# Patient Record
Sex: MALE | Race: WHITE | URBAN - METROPOLITAN AREA
[De-identification: names, ages, dates, MRNs, and addresses within clinical notes are randomized per-mention and may not be internally consistent; named-entity substitution may affect disease eponyms.]

---

## 2019-07-30 ENCOUNTER — PROCEDURE VISIT (OUTPATIENT)
Dept: AUDIOLOGY | Age: 64
End: 2019-07-30
Payer: COMMERCIAL

## 2019-07-30 DIAGNOSIS — H93.13 TINNITUS OF BOTH EARS: ICD-10-CM

## 2019-07-30 DIAGNOSIS — H90.3 SENSORINEURAL HEARING LOSS, BILATERAL: Primary | ICD-10-CM

## 2019-07-30 PROCEDURE — 92567 TYMPANOMETRY: CPT | Performed by: AUDIOLOGIST

## 2019-07-30 PROCEDURE — 92557 COMPREHENSIVE HEARING TEST: CPT | Performed by: AUDIOLOGIST

## 2019-11-08 ENCOUNTER — TELEPHONE (OUTPATIENT)
Dept: AUDIOLOGY | Age: 64
End: 2019-11-08

## 2019-12-06 ENCOUNTER — TELEPHONE (OUTPATIENT)
Dept: AUDIOLOGY | Age: 64
End: 2019-12-06

## 2023-08-23 PROBLEM — K21.9 GERD (GASTROESOPHAGEAL REFLUX DISEASE): Status: ACTIVE | Noted: 2023-08-23

## 2023-08-23 PROBLEM — R07.9 CHEST PAIN: Status: ACTIVE | Noted: 2023-08-23

## 2023-08-23 PROBLEM — R94.39 ABNORMAL STRESS TEST: Status: ACTIVE | Noted: 2023-08-23

## 2023-08-23 PROBLEM — Z95.5 HISTORY OF CORONARY ARTERY STENT PLACEMENT: Status: ACTIVE | Noted: 2023-08-23

## 2023-08-23 PROBLEM — I25.10 CAD (CORONARY ARTERY DISEASE): Status: ACTIVE | Noted: 2023-08-23

## 2023-08-23 RX ORDER — ACETAMINOPHEN 325 MG/1
2 TABLET ORAL CONTINUOUS PRN
COMMUNITY

## 2023-08-23 RX ORDER — CLOPIDOGREL BISULFATE 75 MG/1
1 TABLET ORAL DAILY
COMMUNITY

## 2023-08-23 RX ORDER — TRIAMCINOLONE ACETONIDE 1 MG/G
CREAM TOPICAL 2 TIMES DAILY
COMMUNITY
Start: 2011-09-07 | End: 2024-04-04 | Stop reason: WASHOUT

## 2023-08-23 RX ORDER — CALCIPOTRIENE 50 UG/G
OINTMENT TOPICAL 2 TIMES DAILY
COMMUNITY
Start: 2011-09-07 | End: 2024-04-04 | Stop reason: WASHOUT

## 2023-08-23 RX ORDER — LOSARTAN POTASSIUM AND HYDROCHLOROTHIAZIDE 25; 100 MG/1; MG/1
1 TABLET ORAL DAILY
COMMUNITY
End: 2023-10-03 | Stop reason: SDUPTHER

## 2023-08-23 RX ORDER — GLIPIZIDE 10 MG/1
10 TABLET ORAL 2 TIMES DAILY
COMMUNITY

## 2023-08-23 RX ORDER — METFORMIN HYDROCHLORIDE 1000 MG/1
1 TABLET ORAL
COMMUNITY
Start: 2009-08-20

## 2023-08-23 RX ORDER — ATORVASTATIN CALCIUM 80 MG/1
80 TABLET, FILM COATED ORAL NIGHTLY
COMMUNITY
End: 2024-04-04 | Stop reason: SDUPTHER

## 2023-08-23 RX ORDER — PANTOPRAZOLE SODIUM 20 MG/1
20 TABLET, DELAYED RELEASE ORAL
COMMUNITY
End: 2023-10-03 | Stop reason: SDUPTHER

## 2023-08-23 RX ORDER — BENZONATATE 100 MG/1
1 CAPSULE ORAL EVERY 6 HOURS PRN
COMMUNITY
Start: 2011-10-20 | End: 2024-04-04 | Stop reason: WASHOUT

## 2023-08-23 RX ORDER — NAPROXEN SODIUM 220 MG/1
1 TABLET, FILM COATED ORAL DAILY
COMMUNITY
Start: 2011-09-07 | End: 2024-04-04 | Stop reason: WASHOUT

## 2023-08-23 RX ORDER — EZETIMIBE 10 MG/1
10 TABLET ORAL DAILY
COMMUNITY
End: 2023-10-17 | Stop reason: SDUPTHER

## 2023-08-23 RX ORDER — METOPROLOL TARTRATE 25 MG/1
TABLET, FILM COATED ORAL
COMMUNITY
End: 2023-10-03 | Stop reason: SDUPTHER

## 2023-08-23 RX ORDER — SIMVASTATIN 80 MG/1
1 TABLET, FILM COATED ORAL NIGHTLY
COMMUNITY
Start: 2009-12-17 | End: 2023-10-03 | Stop reason: SDUPTHER

## 2023-08-23 RX ORDER — ISOSORBIDE MONONITRATE 30 MG/1
30 TABLET, EXTENDED RELEASE ORAL DAILY
COMMUNITY
End: 2023-10-03 | Stop reason: SDUPTHER

## 2023-10-01 ASSESSMENT — ENCOUNTER SYMPTOMS
CONSTITUTIONAL NEGATIVE: 1
DYSPNEA ON EXERTION: 0
DECREASED APPETITE: 0
FALLS: 0
BLURRED VISION: 0
SHORTNESS OF BREATH: 0
COUGH: 0
RESPIRATORY NEGATIVE: 1
GASTROINTESTINAL NEGATIVE: 1
IRREGULAR HEARTBEAT: 0
DEPRESSION: 0
MEMORY LOSS: 0
LIGHT-HEADEDNESS: 0
SYNCOPE: 0
ORTHOPNEA: 0
PALPITATIONS: 0
FOCAL WEAKNESS: 0

## 2023-10-01 NOTE — PROGRESS NOTES
Chief Complaint:   3 month cardiovascular follow up of CAD/HTN     History Of Present Illness:    Mr. Unger is coming today as a 3-month cardiovascular follow-up.  We have followed this patient previously for coronary artery disease, hypertension, and hyperlipidemia.  He PCI with MARCEL of LAD in May,2023.    Patient comes in today feeling well.  He just got back from vacation.  He denies any chest pain, pressure, palpitations, orthopnea, or edema.  He reports gaining weight due to dietary indiscretion.  He has had no recent hospitalizations or emergency room visits.    Echocardiogram from May, 2023 shows normal LV systolic function, EF 60%, normal RV size and function, no significant valvular abnormalities.    Left heart catheterization May 15, 2023 with IVUS guided PCI of the proximal LAD MARCEL x1.    Last Recorded Vitals:  Vitals:    10/03/23 0929   BP: 108/60   Pulse: 68   Weight: 93.1 kg (205 lb 3.2 oz)       Past Medical History:  Active Ambulatory Problems     Diagnosis Date Noted    Benign essential hypertension 08/20/2009    Hyperlipidemia 01/01/1995    Type 2 diabetes mellitus (CMS/[facility]) 01/01/2005    Abnormal stress test 08/23/2023    CAD (coronary artery disease) 08/23/2023    Chest pain 08/23/2023    GERD (gastroesophageal reflux disease) 08/23/2023    History of coronary artery stent placement 08/23/2023     Resolved Ambulatory Problems     Diagnosis Date Noted    No Resolved Ambulatory Problems     No Additional Past Medical History        Past Surgical History:  No past surgical history on file.       Social History:      Family History:  No family history on file.     Allergies:  Lisinopril    Outpatient Medications:  Current Outpatient Medications   Medication Instructions    acetaminophen (Tylenol) 325 mg tablet 2 tablets, oral, Continuous PRN    aspirin 81 mg chewable tablet 1 tablet, oral, Daily    aspirin 81 mg EC tablet TAKE 1 TABLET BY MOUTH ONCE DAILY    atorvastatin (Lipitor) 80 mg tablet TAKE 1  TABLET BY MOUTH ONCE DAILY    atorvastatin (LIPITOR) 80 mg, oral, Nightly    benzonatate (Tessalon Perles) 100 mg capsule 1 capsule, oral, Every 6 hours PRN    calcipotriene (Dovonex) 0.005 % ointment Topical, 2 times daily    clopidogrel (Plavix) 75 mg tablet 1 tablet, oral, Daily    ezetimibe (ZETIA) 10 mg, oral, Daily    glipiZIDE (GLUCOTROL) 10 mg, oral, 2 times daily    isosorbide mononitrate ER (Imdur) 30 mg 24 hr tablet TAKE 1 TABLET BY MOUTH ONCE DAILY    isosorbide mononitrate ER (IMDUR) 30 mg, oral, Daily, Do not crush or chew.    losartan-hydrochlorothiazide (Hyzaar) 100-25 mg tablet 1 tablet, oral, Daily    losartan-hydrochlorothiazide (Hyzaar) 100-25 mg tablet TAKE 1 TABLET BY MOUTH ONCE DAILY    metFORMIN (Glucophage) 1,000 mg tablet 1 tablet, oral, 2 times daily with meals    metoprolol tartrate (Lopressor) 25 mg tablet oral    metoprolol tartrate (Lopressor) 25 mg tablet TAKE 1/2 TABLET BY MOUTH TWO TIMES A DAY    pantoprazole (ProtoNix) 20 mg EC tablet TAKE 1 TABLET BY MOUTH ONCE DAILY    pantoprazole (PROTONIX) 20 mg, oral, Daily before breakfast, Do not crush, chew, or split.    simvastatin (Zocor) 80 mg tablet 1 tablet, oral, Nightly    triamcinolone (Kenalog) 0.1 % cream Topical, 2 times daily, To affected areas     Review of Systems   Constitutional: Negative. Negative for decreased appetite and malaise/fatigue.   HENT: Negative.     Eyes:  Negative for blurred vision and visual disturbance.   Cardiovascular:  Negative for chest pain, dyspnea on exertion, irregular heartbeat, leg swelling, orthopnea, palpitations and syncope.   Respiratory: Negative.  Negative for cough and shortness of breath.    Musculoskeletal:  Negative for arthritis and falls.   Gastrointestinal: Negative.    Neurological:  Negative for focal weakness and light-headedness.   Psychiatric/Behavioral:  Negative for depression and memory loss.         Visit Vitals  /60   Pulse 68   Wt 93.1 kg (205 lb 3.2 oz)   BMI 31.20  kg/m²   Smoking Status Never   BSA 2.11 m²        Physical Exam:  GEN: well-nourished, no acute distress  SKIN: no rashes, well perfused  HEENT: normocephalic and atraumatic, no neck swelling  ORAL: lips and oropharynx normal appearance  CV: normal S1 S2 regular, no ankle swelling, no venous prominence in the neck  RESP:lungs are clear, thorax symmetrical, normal respiratory effort and rate, no accessory muscle use  MSK: normal appearing muscle tone, normal range of motion, no joint swelling  NEURO: no gross focal neuro deficit, oriented to time, place, and person  ABD: nondistended  PSYCH: normal mood and affect       Last Labs:  CBC -  Lab Results   Component Value Date    WBC 7.8 05/17/2023    HGB 12.4 (L) 05/17/2023    HCT 37.7 (L) 05/17/2023    MCV 92 05/17/2023     05/17/2023       CMP -  Lab Results   Component Value Date    CALCIUM 8.9 05/17/2023    PROT 7.7 05/12/2023    ALBUMIN 4.3 05/12/2023    AST 24 05/12/2023    ALT 38 05/12/2023    ALKPHOS 55 05/12/2023    BILITOT 0.5 05/12/2023       LIPID PANEL - Lipid panel from May 26, 2023 shows a total cholesterol of 154, triglycerides 135, HDL 35, LDL 96.  Repeat labs are pending.      RENAL FUNCTION PANEL -   Lab Results   Component Value Date    GLUCOSE 103 (H) 05/17/2023     05/17/2023    K 3.8 05/17/2023     05/17/2023    CO2 25 05/17/2023    ANIONGAP 12 05/17/2023    BUN 22 05/17/2023    CREATININE 1.20 05/17/2023    GFRMALE 65 05/17/2023    CALCIUM 8.9 05/17/2023    ALBUMIN 4.3 05/12/2023        Lab Results   Component Value Date    BNP 16 05/12/2023       Last Cardiology Tests:          Lab review: I have personally reviewed the laboratory result(s)     Assessment/Plan       ASHD: Patient is status post PCI with MARCEL of the LAD in May, 2023.  He did participate in cardiac rehab but ended early and went back to his own gym.  He is angina class 0.  Patient will continue on his current cardiac regimen which includes a low-dose aspirin,  statin, clopidogrel, Zetia, isosorbide, losartan/hydrochlorothiazide, and metoprolol.  I encouraged him to be on a heart healthy low-sodium diet.    Hypertension: Blood pressure today is stable.  Patient will continue on his current routine.  I encouraged him to be on a heart healthy low-salt diet.    Hyperlipidemia: Lipid labs are pending.  In May his LDL was suboptimal at 96.  Ideally we would like to see him less than 70.  We will try to get his lab work from his outside lab.    Patient will be scheduled to follow-up with Dr. Mccullough in 6 months.  Patient instructed to call with any cardiovascular complaints. All questions were answered.       Dragon dictation was utilized to create this document. Quite often unanticipated grammatical, syntax,  and other interpretive errors are inadvertently transcribed by the computer software.  Please disregard these errors.  Please excuse any errors that have escaped final proofreading.           Jennifer Quinonez, APRN-CNP

## 2023-10-03 ENCOUNTER — OFFICE VISIT (OUTPATIENT)
Dept: CARDIOLOGY | Facility: CLINIC | Age: 68
End: 2023-10-03
Payer: COMMERCIAL

## 2023-10-03 ENCOUNTER — DOCUMENTATION (OUTPATIENT)
Dept: CARDIOLOGY | Facility: CLINIC | Age: 68
End: 2023-10-03

## 2023-10-03 VITALS
SYSTOLIC BLOOD PRESSURE: 108 MMHG | WEIGHT: 205.2 LBS | BODY MASS INDEX: 31.2 KG/M2 | DIASTOLIC BLOOD PRESSURE: 60 MMHG | HEART RATE: 68 BPM

## 2023-10-03 DIAGNOSIS — E78.00 PURE HYPERCHOLESTEROLEMIA: ICD-10-CM

## 2023-10-03 DIAGNOSIS — I25.10 CORONARY ARTERY DISEASE INVOLVING NATIVE HEART WITHOUT ANGINA PECTORIS, UNSPECIFIED VESSEL OR LESION TYPE: ICD-10-CM

## 2023-10-03 DIAGNOSIS — I10 BENIGN ESSENTIAL HYPERTENSION: Primary | ICD-10-CM

## 2023-10-03 PROCEDURE — 3074F SYST BP LT 130 MM HG: CPT | Performed by: NURSE PRACTITIONER

## 2023-10-03 PROCEDURE — 99213 OFFICE O/P EST LOW 20 MIN: CPT | Performed by: NURSE PRACTITIONER

## 2023-10-03 PROCEDURE — 3008F BODY MASS INDEX DOCD: CPT | Performed by: NURSE PRACTITIONER

## 2023-10-03 PROCEDURE — 3078F DIAST BP <80 MM HG: CPT | Performed by: NURSE PRACTITIONER

## 2023-10-17 ENCOUNTER — PHARMACY VISIT (OUTPATIENT)
Dept: PHARMACY | Facility: CLINIC | Age: 68
End: 2023-10-17
Payer: COMMERCIAL

## 2023-10-17 DIAGNOSIS — I10 BENIGN ESSENTIAL HYPERTENSION: ICD-10-CM

## 2023-10-17 DIAGNOSIS — I25.10 CORONARY ARTERY DISEASE INVOLVING NATIVE HEART WITHOUT ANGINA PECTORIS, UNSPECIFIED VESSEL OR LESION TYPE: ICD-10-CM

## 2023-10-17 DIAGNOSIS — E78.5 HYPERLIPIDEMIA, UNSPECIFIED HYPERLIPIDEMIA TYPE: ICD-10-CM

## 2023-10-17 DIAGNOSIS — R07.9 CHEST PAIN, UNSPECIFIED TYPE: ICD-10-CM

## 2023-10-17 PROCEDURE — RXMED WILLOW AMBULATORY MEDICATION CHARGE

## 2023-10-17 RX ORDER — METOPROLOL TARTRATE 25 MG/1
12.5 TABLET, FILM COATED ORAL 2 TIMES DAILY
Qty: 90 TABLET | Refills: 3 | Status: SHIPPED | OUTPATIENT
Start: 2023-10-17 | End: 2024-04-04 | Stop reason: SDUPTHER

## 2023-10-17 RX ORDER — EZETIMIBE 10 MG/1
10 TABLET ORAL DAILY
Qty: 90 TABLET | Refills: 3 | Status: SHIPPED | OUTPATIENT
Start: 2023-10-17 | End: 2024-04-04 | Stop reason: SDUPTHER

## 2023-10-17 RX ORDER — LOSARTAN POTASSIUM AND HYDROCHLOROTHIAZIDE 25; 100 MG/1; MG/1
1 TABLET ORAL DAILY
Qty: 90 TABLET | Refills: 1 | Status: SHIPPED | OUTPATIENT
Start: 2023-10-17 | End: 2024-04-04 | Stop reason: SDUPTHER

## 2023-10-17 RX ORDER — ISOSORBIDE MONONITRATE 30 MG/1
30 TABLET, EXTENDED RELEASE ORAL DAILY
Qty: 90 TABLET | Refills: 3 | Status: SHIPPED | OUTPATIENT
Start: 2023-10-17 | End: 2024-04-04 | Stop reason: SDUPTHER

## 2024-03-25 RX ORDER — BLOOD-GLUCOSE METER
KIT MISCELLANEOUS
COMMUNITY
Start: 2023-07-10

## 2024-03-25 RX ORDER — LANCETS 28 GAUGE
1 EACH MISCELLANEOUS AS NEEDED
COMMUNITY
Start: 2023-07-10

## 2024-03-25 RX ORDER — MELOXICAM 15 MG/1
15 TABLET ORAL DAILY
COMMUNITY
Start: 2023-05-29

## 2024-04-04 ENCOUNTER — OFFICE VISIT (OUTPATIENT)
Dept: CARDIOLOGY | Facility: CLINIC | Age: 69
End: 2024-04-04
Payer: COMMERCIAL

## 2024-04-04 VITALS
HEART RATE: 65 BPM | HEIGHT: 68 IN | SYSTOLIC BLOOD PRESSURE: 128 MMHG | DIASTOLIC BLOOD PRESSURE: 76 MMHG | WEIGHT: 208 LBS | BODY MASS INDEX: 31.52 KG/M2

## 2024-04-04 DIAGNOSIS — R07.9 CHEST PAIN, UNSPECIFIED TYPE: ICD-10-CM

## 2024-04-04 DIAGNOSIS — I10 BENIGN ESSENTIAL HYPERTENSION: Primary | ICD-10-CM

## 2024-04-04 DIAGNOSIS — I25.10 CORONARY ARTERY DISEASE INVOLVING NATIVE HEART WITHOUT ANGINA PECTORIS, UNSPECIFIED VESSEL OR LESION TYPE: ICD-10-CM

## 2024-04-04 DIAGNOSIS — E78.5 HYPERLIPIDEMIA, UNSPECIFIED HYPERLIPIDEMIA TYPE: ICD-10-CM

## 2024-04-04 PROCEDURE — 3074F SYST BP LT 130 MM HG: CPT | Performed by: INTERNAL MEDICINE

## 2024-04-04 PROCEDURE — 93000 ELECTROCARDIOGRAM COMPLETE: CPT | Performed by: INTERNAL MEDICINE

## 2024-04-04 PROCEDURE — 1159F MED LIST DOCD IN RCRD: CPT | Performed by: INTERNAL MEDICINE

## 2024-04-04 PROCEDURE — 3008F BODY MASS INDEX DOCD: CPT | Performed by: INTERNAL MEDICINE

## 2024-04-04 PROCEDURE — 1036F TOBACCO NON-USER: CPT | Performed by: INTERNAL MEDICINE

## 2024-04-04 PROCEDURE — 99214 OFFICE O/P EST MOD 30 MIN: CPT | Performed by: INTERNAL MEDICINE

## 2024-04-04 PROCEDURE — 3078F DIAST BP <80 MM HG: CPT | Performed by: INTERNAL MEDICINE

## 2024-04-04 RX ORDER — EZETIMIBE 10 MG/1
10 TABLET ORAL DAILY
Qty: 90 TABLET | Refills: 3 | Status: SHIPPED | OUTPATIENT
Start: 2024-04-04 | End: 2025-04-04

## 2024-04-04 RX ORDER — LOSARTAN POTASSIUM AND HYDROCHLOROTHIAZIDE 25; 100 MG/1; MG/1
1 TABLET ORAL DAILY
Qty: 90 TABLET | Refills: 3 | Status: SHIPPED | OUTPATIENT
Start: 2024-04-04 | End: 2025-04-04

## 2024-04-04 RX ORDER — ATORVASTATIN CALCIUM 80 MG/1
80 TABLET, FILM COATED ORAL NIGHTLY
Qty: 90 TABLET | Refills: 3 | Status: SHIPPED | OUTPATIENT
Start: 2024-04-04 | End: 2025-04-04

## 2024-04-04 RX ORDER — ISOSORBIDE MONONITRATE 30 MG/1
30 TABLET, EXTENDED RELEASE ORAL DAILY
Qty: 90 TABLET | Refills: 3 | Status: SHIPPED | OUTPATIENT
Start: 2024-04-04 | End: 2025-04-04

## 2024-04-04 RX ORDER — METOPROLOL TARTRATE 25 MG/1
12.5 TABLET, FILM COATED ORAL 2 TIMES DAILY
Qty: 90 TABLET | Refills: 3 | Status: SHIPPED | OUTPATIENT
Start: 2024-04-04 | End: 2025-04-04

## 2024-04-04 NOTE — PATIENT INSTRUCTIONS
Thanks for following up in office today.    1)  I have refilled your medications for a whole year.  Except the plavix you should be able to stop this at the end of May.    2)  A good blood pressure for you is 130/80 or less.  We talked about your lipid labs and that your LDL is at goal of <70 it is 66.  Continue with the ezetimibe and atorvastatin.  Continue with the moderate intensity exercise three time a week.      3)  Please continue your cardiac medications as prescribed.    Follow up with HEATHER Quinonez NP in  6 months  If you have any questions, please call [phone] and choose option for Dr. Mccullough's nurse Urszula Bush

## 2024-04-04 NOTE — PROGRESS NOTES
Chief Complaint:   No chief complaint on file.     History Of Present Illness:    Ozzy Unger is a 68 y.o. male presenting with for 6 month follow up  H/o CAD s/p recent LAD stent (unstable angina), HTN, DL, DM2 who presents for follow up.     Denies chest pain.  Has been doing some yardwork and fixing up a house without exertional sx.  No LE edema, orthopnea.  No dizziness, syncope.  Does smoke a cigar rarely, but quit cigarette smoking 12 yrs ago.    Echocardiogram from May, 2023 shows normal LV systolic function, EF 60%, normal RV size and function, no significant valvular abnormalities.     Left heart catheterization May 15, 2023 with IVUS guided PCI of the proximal LAD MARCEL x1.    CV Testing reviewed :  12/2023  Lipid labs chol 120, HDL 32, LDL 66  trig 119    5/2023  BMP   K 3.8  BUN 22 crea 1.20    TTE 5/2023  EF 60% mild to moderate conc LVH , trace mitral , tricuspid valve regurg,      5/2023  [facility]  PCI MARCEL to proximal LAD    5/2023  Stress echo : positive for moderate ischemia , suggestive of LAD CAD.  Last Recorded Vitals:  There were no vitals filed for this visit.    Past Medical History:  He has no past medical history on file.    Past Surgical History:  He has no past surgical history on file.      Social History:  He reports that he has never smoked. He has never used smokeless tobacco. No history on file for alcohol use and drug use.    Family History:  No family history on file.     Allergies:  Lisinopril    Outpatient Medications:  Current Outpatient Medications   Medication Instructions    acetaminophen (Tylenol) 325 mg tablet 2 tablets, oral, Continuous PRN    aspirin 81 mg chewable tablet 1 tablet, oral, Daily    aspirin 81 mg EC tablet TAKE 1 TABLET BY MOUTH ONCE DAILY    atorvastatin (Lipitor) 80 mg tablet TAKE 1 TABLET BY MOUTH ONCE DAILY    atorvastatin (LIPITOR) 80 mg, oral, Nightly    benzonatate (Tessalon Perles) 100 mg capsule 1 capsule, oral, Every 6 hours PRN    calcipotriene (Dovonex)  0.005 % ointment Topical, 2 times daily    clopidogrel (Plavix) 75 mg tablet 1 tablet, oral, Daily    ezetimibe (ZETIA) 10 mg, oral, Daily    FreeStyle Lancets 28 gauge 1 each, miscellaneous, As needed    FreeStyle Lite Strips strip     glipiZIDE (GLUCOTROL) 10 mg, oral, 2 times daily    isosorbide mononitrate ER (Imdur) 30 mg 24 hr tablet TAKE 1 TABLET BY MOUTH ONCE DAILY    losartan-hydrochlorothiazide (Hyzaar) 100-25 mg tablet TAKE 1 TABLET BY MOUTH ONCE DAILY    meloxicam (MOBIC) 15 mg, oral, Daily    metFORMIN (Glucophage) 1,000 mg tablet 1 tablet, oral, 2 times daily with meals    metoprolol tartrate (Lopressor) 25 mg tablet TAKE 1/2 TABLET BY MOUTH TWO TIMES A DAY    pantoprazole (ProtoNix) 20 mg EC tablet TAKE 1 TABLET BY MOUTH ONCE DAILY    triamcinolone (Kenalog) 0.1 % cream Topical, 2 times daily, To affected areas       Physical Exam:  Physical Exam  HENT:      Head: Normocephalic.      Nose: Nose normal.      Mouth/Throat:      Mouth: Mucous membranes are moist.   Cardiovascular:      Rate and Rhythm: Normal rate and regular rhythm.      Heart sounds: S1 normal and S2 normal.      Comments: No carotid bruits  No significant murmurs,   No leg swelling   Pulmonary:      Breath sounds: Normal breath sounds.   Abdominal:      Palpations: Abdomen is soft.   Musculoskeletal:         General: Normal range of motion.      Cervical back: Normal range of motion.   Skin:     General: Skin is warm and dry.   Neurological:      General: No focal deficit present.      Mental Status: He is alert.   Psychiatric:         Mood and Affect: Mood normal.            Last Labs:  CBC -  Lab Results   Component Value Date    WBC 7.8 05/17/2023    HGB 12.4 (L) 05/17/2023    HCT 37.7 (L) 05/17/2023    MCV 92 05/17/2023     05/17/2023       CMP -  Lab Results   Component Value Date    CALCIUM 8.9 05/17/2023    PROT 7.7 05/12/2023    ALBUMIN 4.3 05/12/2023    AST 24 05/12/2023    ALT 38 05/12/2023    ALKPHOS 55 05/12/2023     "BILITOT 0.5 05/12/2023       LIPID PANEL -   No results found for: \"CHOL\", \"TRIG\", \"HDL\", \"CHHDL\", \"LDLF\", \"VLDL\", \"NHDL\"    RENAL FUNCTION PANEL -   Lab Results   Component Value Date    GLUCOSE 103 (H) 05/17/2023     05/17/2023    K 3.8 05/17/2023     05/17/2023    CO2 25 05/17/2023    ANIONGAP 12 05/17/2023    BUN 22 05/17/2023    CREATININE 1.20 05/17/2023    GFRMALE 65 05/17/2023    CALCIUM 8.9 05/17/2023    ALBUMIN 4.3 05/12/2023        Lab Results   Component Value Date    BNP 16 05/12/2023           Assessment/Plan   ASHD: Patient is status post PCI with MARCEL of the LAD in May, 2023.  He did participate in cardiac rehab but ended early and went back to his own gym.  He is angina class 0.  Patient will continue on his current cardiac regimen which includes a low-dose aspirin, statin, clopidogrel, Zetia, isosorbide, losartan/hydrochlorothiazide, and metoprolol.  I encouraged him to be on a heart healthy low-sodium diet.  -at the end of 5/2024, ok to stop clopidogrel  -continue ASA 81mg daily  -atorva 80mg / zetia 10mg daily - FLP 12/23 at goal, LDL 66, muscle aches/pains  -metoprolol     Hypertension: Blood pressure today is stable 128/76.  Patient will continue on his current routine.  I encouraged him to be on a heart healthy low-salt diet.  -continue current meds  -to get labs at PCP appt soon     Hyperlipidemia: Lipids 12/23 with improvement in LDL to 66  -continue atorva 80mg / zetia 10mg daily      "

## 2024-10-11 ASSESSMENT — ENCOUNTER SYMPTOMS
CONSTITUTIONAL NEGATIVE: 1
SYNCOPE: 0
BLURRED VISION: 0
GASTROINTESTINAL NEGATIVE: 1
ORTHOPNEA: 0
RESPIRATORY NEGATIVE: 1
FOCAL WEAKNESS: 0
DECREASED APPETITE: 0
FALLS: 0
PALPITATIONS: 0
COUGH: 0
IRREGULAR HEARTBEAT: 0
DYSPNEA ON EXERTION: 0
DEPRESSION: 0
SHORTNESS OF BREATH: 0
LIGHT-HEADEDNESS: 0
MEMORY LOSS: 0

## 2024-10-11 NOTE — PROGRESS NOTES
"Chief Complaint:   6 month cardiovascular follow up of CAD/HTN     History Of Present Illness:    Mr. Unger is coming today as a 6-month cardiovascular follow-up.  We have followed this patient previously for coronary artery disease, hypertension, and hyperlipidemia.  In May, 2023, he had PCI with MARCEL of the LAD.    Patient comes in today feeling well.  He has had no recent hospitalizations or emergency room visits.  He denies any chest pain, pressure, palpitations, orthopnea, or edema.  He has been taking his medication as prescribed.  He was just in to see his PCP and his PCP is ordering his lab work.  Most of his labs were done at an outlying facility.    Last Recorded Vitals:  Vitals:    10/15/24 0817   BP: 120/68   BP Location: Right arm   Patient Position: Sitting   BP Cuff Size: Adult   Pulse: 89   SpO2: 92%   Weight: 92.5 kg (204 lb)   Height: 1.727 m (5' 8\")         Past Medical History:  Active Ambulatory Problems     Diagnosis Date Noted    Benign essential hypertension 08/20/2009    Hyperlipidemia 01/01/1995    Type 2 diabetes mellitus 01/01/2005    Abnormal stress test 08/23/2023    CAD (coronary artery disease) 08/23/2023    Chest pain 08/23/2023    GERD (gastroesophageal reflux disease) 08/23/2023    History of coronary artery stent placement 08/23/2023    Obesity (BMI 30.0-34.9) 01/07/2016    Psoriasis 01/20/2014    Essential hypertension 06/19/2012    DM type 2 with diabetic dyslipidemia (Multi) 01/04/2016     Resolved Ambulatory Problems     Diagnosis Date Noted    No Resolved Ambulatory Problems     No Additional Past Medical History        Past Surgical History:  History reviewed. No pertinent surgical history.       Social History:  Smokes cigars on occasion (quit smoking cigarettes)  Drinks beer daily  No hx of illegal drugs    Family History:  No family history on file.     Allergies:  Lisinopril    Outpatient Medications:  Current Outpatient Medications   Medication Instructions    " "acetaminophen (Tylenol) 325 mg tablet 2 tablets, oral, Continuous PRN    aspirin 81 mg, oral, Daily    atorvastatin (LIPITOR) 80 mg, oral, Nightly    ezetimibe (ZETIA) 10 mg, oral, Daily    FreeStyle Lancets 28 gauge 1 each, miscellaneous, As needed    FreeStyle Lite Strips strip     glipiZIDE (GLUCOTROL) 10 mg, oral, 2 times daily    isosorbide mononitrate ER (Imdur) 30 mg 24 hr tablet TAKE 1 TABLET BY MOUTH ONCE DAILY    losartan (COZAAR) 25 mg, oral, Daily    losartan-hydrochlorothiazide (Hyzaar) 100-25 mg tablet TAKE 1 TABLET BY MOUTH ONCE DAILY    meloxicam (MOBIC) 15 mg, oral, Daily    metFORMIN (Glucophage) 1,000 mg tablet 1 tablet, oral, 2 times daily (morning and late afternoon)    metoprolol tartrate (Lopressor) 25 mg tablet TAKE 1/2 TABLET BY MOUTH TWO TIMES A DAY     Review of Systems   Constitutional: Negative. Negative for decreased appetite and malaise/fatigue.   HENT: Negative.     Eyes:  Negative for blurred vision and visual disturbance.   Cardiovascular:  Negative for chest pain, dyspnea on exertion, irregular heartbeat, leg swelling, orthopnea, palpitations and syncope.   Respiratory: Negative.  Negative for cough and shortness of breath.    Musculoskeletal:  Negative for arthritis and falls.   Gastrointestinal: Negative.    Neurological:  Negative for focal weakness and light-headedness.   Psychiatric/Behavioral:  Negative for depression and memory loss.         Visit Vitals  /68 (BP Location: Right arm, Patient Position: Sitting, BP Cuff Size: Adult)   Pulse 89   Ht 1.727 m (5' 8\")   Wt 92.5 kg (204 lb)   SpO2 92%   BMI 31.02 kg/m²   Smoking Status Some Days   BSA 2.11 m²           Physical Exam:  GEN: well-nourished, no acute distress  SKIN: no rashes, well perfused  HEENT: normocephalic and atraumatic, no neck swelling  ORAL: lips and oropharynx normal appearance  CV: normal S1 S2 regular, no ankle swelling, no venous prominence in the neck  RESP:lungs are clear, thorax symmetrical, " normal respiratory effort and rate, no accessory muscle use  MSK: normal appearing muscle tone, normal range of motion, no joint swelling  NEURO: no gross focal neuro deficit, oriented to time, place, and person  ABD: nondistended  PSYCH: normal mood and affect       Last Labs:  CBC -  Lab Results   Component Value Date    WBC 7.8 05/17/2023    HGB 12.4 (L) 05/17/2023    HCT 37.7 (L) 05/17/2023    MCV 92 05/17/2023     05/17/2023       CMP -  Lab Results   Component Value Date    CALCIUM 8.9 05/17/2023    PROT 7.7 05/12/2023    ALBUMIN 4.3 05/12/2023    AST 24 05/12/2023    ALT 38 05/12/2023    ALKPHOS 55 05/12/2023    BILITOT 0.5 05/12/2023       LIPID PANEL - Lipid panel from May 26, 2023 shows a total cholesterol of 154, triglycerides 135, HDL 35, LDL 96.  Repeat labs are pending.      RENAL FUNCTION PANEL -   Lab Results   Component Value Date    GLUCOSE 103 (H) 05/17/2023     05/17/2023    K 3.8 05/17/2023     05/17/2023    CO2 25 05/17/2023    ANIONGAP 12 05/17/2023    BUN 22 05/17/2023    CREATININE 1.20 05/17/2023    GFRMALE 65 05/17/2023    CALCIUM 8.9 05/17/2023    ALBUMIN 4.3 05/12/2023        Lab Results   Component Value Date    BNP 16 05/12/2023       Last Cardiology Tests:  Echo 5-16-23:  CONCLUSIONS:  1. Left ventricular systolic function is normal with a 60% estimated ejection fraction.     [facility] 5-15-23:  CONCLUSIONS:  1. Unstable angina.  2. Successful IVUS guided PCI of the proximal LAD with a 4.0 mm x 15 mm [place] drug eluting stent, proximal stent post-dilated with a 4.0 NC balloon to high pressures (4.25 mm).  3. ASA 81 mg daily and ticagrelor 90 mg BID.  4. GDMT for SIHD.      Lab review: I have personally reviewed the laboratory result(s)     Assessment/Plan   ASHD: Patient had PCI with MARCEL to the LAD in May 2023.  He was taken off of Plavix in May 2024.  He continues on a low-dose aspirin, atorvastatin, Zetia, isosorbide, losartan, and metoprolol.  Patient is angina  class 0.  He should be on a heart healthy low-sodium diet.    Hypertension: Blood pressure today shows excellent control.  He will continue on his current dose of losartan, losartan/hydrochlorothiazide, metoprolol, and Imdur.  I encouraged him to be on a low-sodium diet.    Dyslipidemia: Most recent lipid labs we have available were done September, 2023 showing triglycerides 119, LDL 66.  Patient will continue on atorvastatin 80 mg nightly and Zetia 10 mg daily.  He follows with his PCP for his lab work.    Patient will be due to follow-up with Dr. Mccullough in 6 months.  He is getting his lab work done at an outside lab, we will need to try to get copies once available.  Patient instructed to call with any cardiovascular complaints. All questions were answered.       Dragon dictation was utilized to create this document. Quite often unanticipated grammatical, syntax,  and other interpretive errors are inadvertently transcribed by the computer software.  Please disregard these errors.  Please excuse any errors that have escaped final proofreading.           Jennifer Quinonez, APRN-CNP

## 2024-10-15 ENCOUNTER — TELEPHONE (OUTPATIENT)
Dept: CARDIOLOGY | Facility: HOSPITAL | Age: 69
End: 2024-10-15

## 2024-10-15 ENCOUNTER — APPOINTMENT (OUTPATIENT)
Dept: CARDIOLOGY | Facility: CLINIC | Age: 69
End: 2024-10-15
Payer: COMMERCIAL

## 2024-10-15 VITALS
SYSTOLIC BLOOD PRESSURE: 120 MMHG | WEIGHT: 204 LBS | HEIGHT: 68 IN | DIASTOLIC BLOOD PRESSURE: 68 MMHG | BODY MASS INDEX: 30.92 KG/M2 | HEART RATE: 89 BPM | OXYGEN SATURATION: 92 %

## 2024-10-15 DIAGNOSIS — I25.10 ASHD (ARTERIOSCLEROTIC HEART DISEASE): Primary | ICD-10-CM

## 2024-10-15 DIAGNOSIS — I10 ESSENTIAL HYPERTENSION: ICD-10-CM

## 2024-10-15 DIAGNOSIS — E78.5 DYSLIPIDEMIA: ICD-10-CM

## 2024-10-15 PROCEDURE — 4010F ACE/ARB THERAPY RXD/TAKEN: CPT | Performed by: NURSE PRACTITIONER

## 2024-10-15 PROCEDURE — 99213 OFFICE O/P EST LOW 20 MIN: CPT | Performed by: NURSE PRACTITIONER

## 2024-10-15 PROCEDURE — 1159F MED LIST DOCD IN RCRD: CPT | Performed by: NURSE PRACTITIONER

## 2024-10-15 PROCEDURE — 3008F BODY MASS INDEX DOCD: CPT | Performed by: NURSE PRACTITIONER

## 2024-10-15 PROCEDURE — 3078F DIAST BP <80 MM HG: CPT | Performed by: NURSE PRACTITIONER

## 2024-10-15 PROCEDURE — 3074F SYST BP LT 130 MM HG: CPT | Performed by: NURSE PRACTITIONER

## 2024-10-15 RX ORDER — ASPIRIN 81 MG/1
81 TABLET ORAL DAILY
COMMUNITY

## 2024-10-15 RX ORDER — LOSARTAN POTASSIUM 25 MG/1
25 TABLET ORAL DAILY
COMMUNITY

## 2024-10-15 NOTE — TELEPHONE ENCOUNTER
I called [facility] to confirm what med the patient is taking of the losartan?  Patient states his bottle said it only read losartan 25 mg.  I called and confirmed with [facility] that what he is currretly taking which is Losartan/hydrochlorothiazide 100/25 mg  Updated med list.

## 2024-10-15 NOTE — PATIENT INSTRUCTIONS
Continue on current meds  Heart healthy, low sodium diet  Mediterranean diet is recommended  Follow up with Dr Mccullough in 6 months

## 2024-10-29 DIAGNOSIS — I25.10 ASHD (ARTERIOSCLEROTIC HEART DISEASE): Primary | ICD-10-CM

## 2024-10-29 RX ORDER — ASPIRIN 81 MG/1
81 TABLET ORAL DAILY
Qty: 90 TABLET | Refills: 3 | OUTPATIENT
Start: 2024-10-29

## 2024-10-29 RX ORDER — ASPIRIN 81 MG/1
81 TABLET ORAL DAILY
Qty: 90 TABLET | Refills: 3 | Status: SHIPPED | OUTPATIENT
Start: 2024-10-29

## 2025-02-06 DIAGNOSIS — R07.9 CHEST PAIN, UNSPECIFIED TYPE: ICD-10-CM

## 2025-02-06 DIAGNOSIS — I25.10 ASHD (ARTERIOSCLEROTIC HEART DISEASE): Primary | ICD-10-CM

## 2025-02-06 PROCEDURE — RXMED WILLOW AMBULATORY MEDICATION CHARGE

## 2025-02-06 RX ORDER — ISOSORBIDE MONONITRATE 30 MG/1
30 TABLET, EXTENDED RELEASE ORAL DAILY
Qty: 30 TABLET | Refills: 0 | Status: SHIPPED | OUTPATIENT
Start: 2025-02-06 | End: 2025-03-08

## 2025-02-06 NOTE — TELEPHONE ENCOUNTER
----- Message from Jaime BARRERA sent at 2/6/2025  4:16 PM EST -----  Regarding: MM Prescription Refill  Patient called for refill of Isosorbide mononitrate 30 mg Daily.  Please send to [facility].  His insurance has changed and it now needs to go to [facility].    He only has 2 pills left and would like to know if he can get a weeks worth sent here at [place] until the new script gets processed.  He would like a call back when you put that in.      Thank you.

## 2025-02-10 ENCOUNTER — PHARMACY VISIT (OUTPATIENT)
Dept: PHARMACY | Facility: CLINIC | Age: 70
End: 2025-02-10
Payer: COMMERCIAL

## 2025-02-11 LAB
ANION GAP SERPL CALCULATED.4IONS-SCNC: 7 MMOL/L (CALC) (ref 7–17)
BUN SERPL-MCNC: 24 MG/DL (ref 7–25)
BUN/CREAT SERPL: ABNORMAL (CALC) (ref 6–22)
CALCIUM SERPL-MCNC: 9.3 MG/DL (ref 8.6–10.3)
CHLORIDE SERPL-SCNC: 98 MMOL/L (ref 98–110)
CO2 SERPL-SCNC: 33 MMOL/L (ref 20–32)
CREAT SERPL-MCNC: 1.17 MG/DL (ref 0.7–1.28)
EGFRCR SERPLBLD CKD-EPI 2021: 67 ML/MIN/1.73M2
GLUCOSE SERPL-MCNC: 188 MG/DL (ref 65–99)
POTASSIUM SERPL-SCNC: 4.6 MMOL/L (ref 3.5–5.3)
SODIUM SERPL-SCNC: 138 MMOL/L (ref 135–146)

## 2025-02-18 DIAGNOSIS — R07.9 CHEST PAIN, UNSPECIFIED TYPE: ICD-10-CM

## 2025-02-18 RX ORDER — ISOSORBIDE MONONITRATE 30 MG/1
30 TABLET, EXTENDED RELEASE ORAL DAILY
Qty: 90 TABLET | Refills: 2 | Status: SHIPPED | OUTPATIENT
Start: 2025-02-18

## 2025-04-11 NOTE — PROGRESS NOTES
Chief Complaint:   No chief complaint on file.     History Of Present Illness:    Ozzy Unger is a 69 y.o. male presenting for 6-month follow up.  H/o CAD s/p proximal LAD stent 5/2023 (unstable angina), HTN, DL, DM2 who presents for follow up.     Last seen by HEATHER Quinonez in 10/2024. He was doing well at the time, no cardiac complaints.     Today, he states he is still doing well. He stopped smoking 2 cigars daily 1 week ago. His BP is low today. He mentions he donated blood yesterday.     Review Of Systems:    Denies chest pain/pressure, SOB, dyspnea on exertion, LE edema, orthopnea, PND, palpitations, LH/dizziness, presyncope, overt syncope.    The remainder of the review of systems was obtained, and was negative as pertains to the chief complaint.    CV testing and labs reviewed:    Labs 2/2025: K 4.6  BUN 24  Cr 1.17    Lipid labs 10/2024:    HDL 31  LDL 59      TTE 5/2023  EF 60% mild to moderate conc LVH , trace mitral , tricuspid valve regurg,      5/2023  [facility]  PCI MARCEL to proximal LAD    5/2023  Stress echo : positive for moderate ischemia , suggestive of LAD CAD.    Last Recorded Vitals:  There were no vitals filed for this visit.    Past Medical History:  He has no past medical history on file.    Past Surgical History:  He has no past surgical history on file.      Social History:  He reports that he has been smoking cigarettes. He has never used smokeless tobacco. He reports that he does not use drugs. No history on file for alcohol use.    Family History:  No family history on file.     Allergies:  Lisinopril    Outpatient Medications:  Current Outpatient Medications   Medication Instructions    acetaminophen (Tylenol) 325 mg tablet 2 tablets, oral, Continuous PRN    aspirin 81 mg, oral, Daily    atorvastatin (LIPITOR) 80 mg, oral, Nightly    ezetimibe (ZETIA) 10 mg, oral, Daily    FreeStyle Lancets 28 gauge 1 each, miscellaneous, As needed    FreeStyle Lite Strips strip     glipiZIDE  "(GLUCOTROL) 10 mg, oral, 2 times daily    isosorbide mononitrate ER (Imdur) 30 mg 24 hr tablet TAKE 1 TABLET BY MOUTH ONCE DAILY    losartan (COZAAR) 25 mg, oral, Daily    losartan-hydrochlorothiazide (Hyzaar) 100-25 mg tablet TAKE 1 TABLET BY MOUTH ONCE DAILY    meloxicam (MOBIC) 15 mg, oral, Daily    metFORMIN (Glucophage) 1,000 mg tablet 1 tablet, oral, 2 times daily (morning and late afternoon)    metoprolol tartrate (Lopressor) 25 mg tablet TAKE 1/2 TABLET BY MOUTH TWO TIMES A DAY     Physical Exam:  Physical Exam  HENT:      Head: Normocephalic.      Nose: Nose normal.      Mouth/Throat:      Mouth: Mucous membranes are moist.   Neck:      Vascular: No carotid bruit.   Cardiovascular:      Rate and Rhythm: Normal rate and regular rhythm.      Heart sounds: S1 normal and S2 normal. Murmur heard.      Comments: Normal S1, S2 regular  1/6 systolic murmur to RUSB  Pulmonary:      Breath sounds: Normal breath sounds.   Abdominal:      Palpations: Abdomen is soft.   Musculoskeletal:         General: Normal range of motion.      Cervical back: Normal range of motion.   Skin:     General: Skin is warm and dry.   Neurological:      General: No focal deficit present.      Mental Status: He is alert.   Psychiatric:         Mood and Affect: Mood normal.        Last Labs:  CBC -  Lab Results   Component Value Date    WBC 7.8 05/17/2023    HGB 12.4 (L) 05/17/2023    HCT 37.7 (L) 05/17/2023    MCV 92 05/17/2023     05/17/2023       CMP -  Lab Results   Component Value Date    CALCIUM 9.3 02/10/2025    PROT 7.7 05/12/2023    ALBUMIN 4.3 05/12/2023    AST 24 05/12/2023    ALT 38 05/12/2023    ALKPHOS 55 05/12/2023    BILITOT 0.5 05/12/2023       LIPID PANEL -   No results found for: \"CHOL\", \"TRIG\", \"HDL\", \"CHHDL\", \"LDLF\", \"VLDL\", \"NHDL\"    RENAL FUNCTION PANEL -   Lab Results   Component Value Date    GLUCOSE 188 (H) 02/10/2025     02/10/2025    K 4.6 02/10/2025    CL 98 02/10/2025    CO2 33 (H) 02/10/2025    " ANIONGAP 7 02/10/2025    BUN 24 02/10/2025    CREATININE 1.17 02/10/2025    GFRMALE 65 05/17/2023    CALCIUM 9.3 02/10/2025    ALBUMIN 4.3 05/12/2023        Lab Results   Component Value Date    BNP 16 05/12/2023     Assessment/Plan   ASHD: Patient is status post PCI with MARCEL of the LAD in May, 2023.  He did participate in cardiac rehab but ended early and went back to his own gym.  He is angina class 0.  Patient will continue on his current cardiac regimen which includes a low-dose aspirin, statin, clopidogrel, Zetia, isosorbide, losartan/hydrochlorothiazide, and metoprolol.  I encouraged him to be on a heart healthy low-sodium diet.  -at the end of 5/2024, ok to stop clopidogrel  -continue ASA 81mg daily  -atorva 80mg / zetia 10mg daily - FLP 10/24 at goal, LDL 59, muscle aches/pains  -metoprolol     Hypertension:   BP today is on the low-end 98/62 > 115/60 normalized. Reports he donated blood yesterday.  Losartan has been slowly decreased.   -encouraged him to be on a heart healthy low-salt diet.  -continue current meds     Hyperlipidemia:   FLP 10/2024:  LDL 59   - at goal   -continue atorva 80mg / zetia 10mg daily    Scribe Attestation  By signing my name below, I, Kala Palomo, Scribe   attest that this documentation has been prepared under the direction and in the presence of Rosa Mccullough MD.

## 2025-04-15 ENCOUNTER — OFFICE VISIT (OUTPATIENT)
Dept: CARDIOLOGY | Facility: HOSPITAL | Age: 70
End: 2025-04-15
Payer: MEDICARE

## 2025-04-15 VITALS
SYSTOLIC BLOOD PRESSURE: 115 MMHG | BODY MASS INDEX: 30.86 KG/M2 | HEART RATE: 72 BPM | DIASTOLIC BLOOD PRESSURE: 60 MMHG | HEIGHT: 68 IN | WEIGHT: 203.6 LBS

## 2025-04-15 DIAGNOSIS — R07.9 CHEST PAIN, UNSPECIFIED TYPE: ICD-10-CM

## 2025-04-15 DIAGNOSIS — I25.10 CORONARY ARTERY DISEASE INVOLVING NATIVE HEART WITHOUT ANGINA PECTORIS, UNSPECIFIED VESSEL OR LESION TYPE: ICD-10-CM

## 2025-04-15 DIAGNOSIS — I10 BENIGN ESSENTIAL HYPERTENSION: ICD-10-CM

## 2025-04-15 DIAGNOSIS — E78.5 HYPERLIPIDEMIA, UNSPECIFIED HYPERLIPIDEMIA TYPE: ICD-10-CM

## 2025-04-15 LAB
ATRIAL RATE: 72 BPM
P AXIS: 58 DEGREES
P OFFSET: 196 MS
P ONSET: 140 MS
PR INTERVAL: 174 MS
Q ONSET: 227 MS
QRS COUNT: 12 BEATS
QRS DURATION: 86 MS
QT INTERVAL: 384 MS
QTC CALCULATION(BAZETT): 420 MS
QTC FREDERICIA: 408 MS
R AXIS: 4 DEGREES
T AXIS: 40 DEGREES
T OFFSET: 419 MS
VENTRICULAR RATE: 72 BPM

## 2025-04-15 PROCEDURE — 93010 ELECTROCARDIOGRAM REPORT: CPT | Performed by: INTERNAL MEDICINE

## 2025-04-15 PROCEDURE — 4010F ACE/ARB THERAPY RXD/TAKEN: CPT | Performed by: INTERNAL MEDICINE

## 2025-04-15 PROCEDURE — 99214 OFFICE O/P EST MOD 30 MIN: CPT | Mod: 25 | Performed by: INTERNAL MEDICINE

## 2025-04-15 PROCEDURE — 3078F DIAST BP <80 MM HG: CPT | Performed by: INTERNAL MEDICINE

## 2025-04-15 PROCEDURE — 99214 OFFICE O/P EST MOD 30 MIN: CPT | Performed by: INTERNAL MEDICINE

## 2025-04-15 PROCEDURE — 1159F MED LIST DOCD IN RCRD: CPT | Performed by: INTERNAL MEDICINE

## 2025-04-15 PROCEDURE — 3074F SYST BP LT 130 MM HG: CPT | Performed by: INTERNAL MEDICINE

## 2025-04-15 PROCEDURE — 93005 ELECTROCARDIOGRAM TRACING: CPT | Performed by: INTERNAL MEDICINE

## 2025-04-15 PROCEDURE — 3008F BODY MASS INDEX DOCD: CPT | Performed by: INTERNAL MEDICINE

## 2025-04-15 RX ORDER — ISOSORBIDE MONONITRATE 30 MG/1
30 TABLET, EXTENDED RELEASE ORAL DAILY
Qty: 90 TABLET | Refills: 3 | Status: SHIPPED | OUTPATIENT
Start: 2025-04-15 | End: 2026-04-15

## 2025-04-15 RX ORDER — LOSARTAN POTASSIUM 25 MG/1
25 TABLET ORAL DAILY
Qty: 90 TABLET | Refills: 3 | Status: SHIPPED | OUTPATIENT
Start: 2025-04-15 | End: 2026-04-15

## 2025-04-15 RX ORDER — METOPROLOL TARTRATE 25 MG/1
12.5 TABLET, FILM COATED ORAL 2 TIMES DAILY
Qty: 90 TABLET | Refills: 3 | Status: SHIPPED | OUTPATIENT
Start: 2025-04-15 | End: 2026-04-15

## 2025-04-15 RX ORDER — EZETIMIBE 10 MG/1
10 TABLET ORAL DAILY
Qty: 90 TABLET | Refills: 3 | Status: SHIPPED | OUTPATIENT
Start: 2025-04-15 | End: 2026-04-15

## 2025-04-15 RX ORDER — ATORVASTATIN CALCIUM 80 MG/1
80 TABLET, FILM COATED ORAL NIGHTLY
Qty: 90 TABLET | Refills: 3 | Status: SHIPPED | OUTPATIENT
Start: 2025-04-15 | End: 2026-04-15

## 2025-04-15 NOTE — PATIENT INSTRUCTIONS
Thanks for following up in office today.    1)  Please continue your cardiac medications as prescribed. They are working well for you. I refilled your medications today.     Follow up with Kourtney Quinonez in 6 months  If you have any questions, please call us at [phone]